# Patient Record
Sex: FEMALE | URBAN - METROPOLITAN AREA
[De-identification: names, ages, dates, MRNs, and addresses within clinical notes are randomized per-mention and may not be internally consistent; named-entity substitution may affect disease eponyms.]

---

## 2021-01-26 ENCOUNTER — TELEPHONE (OUTPATIENT)
Dept: OBGYN CLINIC | Facility: CLINIC | Age: 26
End: 2021-01-26

## 2021-01-26 NOTE — TELEPHONE ENCOUNTER
Pt states she is very frustrated and anxious  Looking a lot of stuff up online would like questions answers  Being see in [address] [place] [address] through [facility]  IUD removed 10/28/21 LMP 10/31/20 or 20, positive UPT 20 went to get a 3D US at an outside place in [place] on 20 and another US 20 NOB apt 21, intake apt, no US at that apt  Returned 21 for US apt for transvaginal and abdomen completed, measuring 5w5d recommending repeat US this week  Pt is a , healthy daughter 1yr 8 months  Pt was referred to us by Yudi Sosa recommended to speak with Lucia  Pt aware I will send a message to Lucia to review  Insurance Laura through her employer   Pt not planning on transferring care lives in [place], [place]

## 2021-01-27 NOTE — TELEPHONE ENCOUNTER
I do not have her records to make accurate recommendation and she would need to have an appointment with us for me to give her medical advice  I will say based on her positive pregnancy test of 12/1/2020  Typically pregnancy test can be positive for up to 5 days prior to missed menses  She should have been between 10 5-11 5 weeks on 1/22/2021 based on when she got a positive pregnancy test   If only measuring 5w5d on 1/22/2021 very concerning for early pregnancy loss

## 2023-09-10 ENCOUNTER — APPOINTMENT (EMERGENCY)
Dept: RADIOLOGY | Facility: HOSPITAL | Age: 28
End: 2023-09-10
Payer: COMMERCIAL

## 2023-09-10 ENCOUNTER — HOSPITAL ENCOUNTER (EMERGENCY)
Facility: HOSPITAL | Age: 28
Discharge: HOME | End: 2023-09-10
Attending: EMERGENCY MEDICINE
Payer: COMMERCIAL

## 2023-09-10 VITALS
RESPIRATION RATE: 16 BRPM | OXYGEN SATURATION: 97 % | HEART RATE: 96 BPM | DIASTOLIC BLOOD PRESSURE: 77 MMHG | SYSTOLIC BLOOD PRESSURE: 121 MMHG | TEMPERATURE: 98.2 F

## 2023-09-10 DIAGNOSIS — M79.641 RIGHT HAND PAIN: Primary | ICD-10-CM

## 2023-09-10 PROCEDURE — 73130 X-RAY EXAM OF HAND: CPT

## 2023-09-10 PROCEDURE — 99284 EMERGENCY DEPT VISIT MOD MDM: CPT | Performed by: EMERGENCY MEDICINE

## 2023-09-10 PROCEDURE — 99283 EMERGENCY DEPT VISIT LOW MDM: CPT

## 2023-09-10 PROCEDURE — 29130 APPL FINGER SPLINT STATIC: CPT | Performed by: EMERGENCY MEDICINE

## 2023-09-10 NOTE — ED PROVIDER NOTES
History  Chief Complaint   Patient presents with   • Hand Pain     Pain in r hand below r pointer finger. Pain has been present for 3 days. No known injury. Pt reports cracking knuckles. Pt took tylenol approx hour ago     Patient presents for evaluation of pain in her right hand. Pain is at the first MCP joint of the right hand and has pain with movement of the right finger. Denies any known injury or trauma. Is been bothering her for months but worse over the last few days. States she cracks her knuckles and is not sure if that is related. History provided by:  Patient   used: No        None       No past medical history on file. Past Surgical History:   Procedure Laterality Date   •  SECTION         No family history on file. I have reviewed and agree with the history as documented. E-Cigarette/Vaping     E-Cigarette/Vaping Substances          Review of Systems   All other systems reviewed and are negative. Physical Exam  Physical Exam  Vitals and nursing note reviewed. Constitutional:       General: She is not in acute distress. Musculoskeletal:         General: Swelling and tenderness present. No deformity. Normal range of motion. Hands:    Skin:     Capillary Refill: Capillary refill takes less than 2 seconds. Findings: No bruising, erythema or rash. Neurological:      General: No focal deficit present. Mental Status: She is alert and oriented to person, place, and time.          Vital Signs  ED Triage Vitals [09/10/23 0613]   Temperature Pulse Respirations Blood Pressure SpO2   98.2 °F (36.8 °C) 96 16 121/77 97 %      Temp Source Heart Rate Source Patient Position - Orthostatic VS BP Location FiO2 (%)   Oral Monitor -- -- --      Pain Score       --           Vitals:    09/10/23 0613   BP: 121/77   Pulse: 96         Visual Acuity      ED Medications  Medications - No data to display    Diagnostic Studies  Results Reviewed     None XR hand 3+ views RIGHT   Final Result by Attila Pastor MD (09/10 123)   No acute displaced fracture. No dislocation         Workstation performed: [id number]                    Procedures  Orthopedic injury treatment    Date/Time: 9/10/2023 7:00 AM    Performed by: Greg Jiménez DO  Authorized by: Greg Jiménez DO    Patient Location:  ED  [place] Protocol:  Consent: Verbal consent obtained. Consent given by: patient  Patient identity confirmed: verbally with patient and arm band      Injury location:  Finger  Location details:  Right index finger  Injury type: Soft tissue  Neurovascular status: Neurovascularly intact    Distal perfusion: normal    Neurological function: normal    Range of motion: reduced    Immobilization:  Splint  Splint type:  Finger splint, static  Supplies used:  Aluminum splint  Neurovascular status: Neurovascularly intact    Distal perfusion: normal    Neurological function: normal    Range of motion: unchanged    Patient tolerance:  Patient tolerated the procedure well with no immediate complications             ED Course                               SBIRT 22yo+    Flowsheet Row Most Recent Value   Initial Alcohol Screen: US AUDIT-C     1. How often do you have a drink containing alcohol? 0 Filed at: 09/10/2023 5463   Audit-C Score 0 Filed at: 09/10/2023 1053   AYANNA: How many times in the past year have you. .. Used an illegal drug or used a prescription medication for non-medical reasons? Never Filed at: 09/10/2023 6990                    Medical Decision Making  Pulse ox 97% on room air indicating adequate oxygenation. Xray R hand: No fracture or dislocation as read by me. Right hand pain: acute illness or injury  Amount and/or Complexity of Data Reviewed  Radiology: ordered.           Disposition  Final diagnoses:   Right hand pain     Time reflects when diagnosis was documented in both MDM as applicable and the Disposition within this note     Time User Action Codes Description Comment    9/10/2023  7:11 AM Idris Piña Add [I99.408] Right hand pain       ED Disposition     ED Disposition   Discharge    Condition   Stable    Date/Time   Sun Sep 10, 2023  7:11 AM    Comment   Renita Hollins discharge to home/self care. Follow-up Information    None         There are no discharge medications for this patient.           PDMP Review     None          ED Provider  Electronically Signed by           Idris Piña DO  09/11/23 0320

## 2024-05-23 ENCOUNTER — OFFICE VISIT (OUTPATIENT)
Dept: FAMILY MEDICINE CLINIC | Facility: CLINIC | Age: 29
End: 2024-05-23
Payer: COMMERCIAL

## 2024-05-23 DIAGNOSIS — E34.9 HORMONE IMBALANCE: ICD-10-CM

## 2024-05-23 DIAGNOSIS — F41.1 GAD (GENERALIZED ANXIETY DISORDER): ICD-10-CM

## 2024-05-23 DIAGNOSIS — E03.9 ACQUIRED HYPOTHYROIDISM: Primary | ICD-10-CM

## 2024-05-23 DIAGNOSIS — E87.8 ELECTROLYTE ABNORMALITY: ICD-10-CM

## 2024-05-23 DIAGNOSIS — Z13.1 DIABETES MELLITUS SCREENING: ICD-10-CM

## 2024-05-23 DIAGNOSIS — R00.2 PALPITATIONS: ICD-10-CM

## 2024-05-23 DIAGNOSIS — E53.8 B12 DEFICIENCY: ICD-10-CM

## 2024-05-23 DIAGNOSIS — Z13.220 SCREENING CHOLESTEROL LEVEL: ICD-10-CM

## 2024-05-23 DIAGNOSIS — Z13.0 SCREENING, IRON DEFICIENCY ANEMIA: ICD-10-CM

## 2024-05-23 DIAGNOSIS — E55.9 VITAMIN D DEFICIENCY: ICD-10-CM

## 2024-05-23 PROCEDURE — 99204 OFFICE O/P NEW MOD 45 MIN: CPT | Performed by: STUDENT IN AN ORGANIZED HEALTH CARE EDUCATION/TRAINING PROGRAM

## 2024-05-24 VITALS
OXYGEN SATURATION: 98 % | SYSTOLIC BLOOD PRESSURE: 110 MMHG | WEIGHT: 202 LBS | BODY MASS INDEX: 42.4 KG/M2 | RESPIRATION RATE: 12 BRPM | HEART RATE: 72 BPM | HEIGHT: 58 IN | DIASTOLIC BLOOD PRESSURE: 72 MMHG | TEMPERATURE: 97.8 F

## 2024-05-24 PROBLEM — R00.2 PALPITATIONS: Status: ACTIVE | Noted: 2024-05-24

## 2024-05-24 PROBLEM — E03.9 ACQUIRED HYPOTHYROIDISM: Status: ACTIVE | Noted: 2024-05-24

## 2024-05-24 PROBLEM — F41.1 GAD (GENERALIZED ANXIETY DISORDER): Status: ACTIVE | Noted: 2024-05-24

## 2024-05-24 NOTE — PROGRESS NOTES
Clinic Visit Note  Nery Joy 28 y.o. female   MRN: [id number]    Assessment and Plan      Diagnoses and all orders for this visit:    Acquired hypothyroidism  Recommend follow-up yearly blood work, reevaluation in office in 2-3 weeks for review.  -     TSH, 3rd generation; Future  -     T4, free; Future  -     TSH, 3rd generation  -     T4, free    DEMI (generalized anxiety disorder)  Palpitations  Follow-up blood work, EKG at next visit, unable to perform due to power outage    B12 deficiency  -     Vitamin B12; Future  -     Vitamin B12    Vitamin D deficiency  -     Vitamin D 25 hydroxy; Future  -     Vitamin D 25 hydroxy    Diabetes mellitus screening  -     Hemoglobin A1C; Future  -     Hemoglobin A1C    Screening, iron deficiency anemia  -     CBC and differential; Future  -     CBC and differential    Electrolyte abnormality  -     Comprehensive metabolic panel; Future  -     Comprehensive metabolic panel    Screening cholesterol level  -     Lipid panel; Future  -     Lipid panel    Hormone imbalance  Patient is following up with OB/GYN, will follow-up with hormone panel if not ordered    My impressions and treatment recommendations were discussed in detail with the patient who verbalized understanding and had no further questions.  Discharge instructions were provided.    Subjective     Chief Complaint: New patient encounter    History of Present Illness:    Patient is a 28-year-old female coming in for new patient encounter, medical history reviewed, medications reviewed.  Please see assessment/plan above for further evaluation.    The following portions of the patient's history were reviewed and updated as appropriate: allergies, current medications, past family history, past medical history, past social history, past surgical history and problem list.    REVIEW OF SYSTEMS:  A complete 12-point review of systems is negative other than that noted in the HPI.    Review of Systems   Constitutional:   Negative for chills and fever.   HENT:  Negative for ear pain and sore throat.    Eyes:  Negative for pain and visual disturbance.   Respiratory:  Negative for cough and shortness of breath.    Cardiovascular:  Negative for chest pain and palpitations.   Gastrointestinal:  Negative for abdominal pain and vomiting.   Genitourinary:  Negative for dysuria and hematuria.   Musculoskeletal:  Negative for arthralgias and back pain.   Skin:  Negative for color change and rash.   Neurological:  Negative for seizures and syncope.   Psychiatric/Behavioral:  The patient is nervous/anxious.    All other systems reviewed and are negative.      No current outpatient medications on file.  History reviewed. No pertinent past medical history.  Past Surgical History:   Procedure Laterality Date   •  SECTION       Social History     Socioeconomic History   • Marital status: Single     Spouse name: Not on file   • Number of children: Not on file   • Years of education: Not on file   • Highest education level: Not on file   Occupational History   • Not on file   Tobacco Use   • Smoking status: Never   • Smokeless tobacco: Never   Substance and Sexual Activity   • Alcohol use: Never   • Drug use: Never   • Sexual activity: Not on file   Other Topics Concern   • Not on file   Social History Narrative   • Not on file     Social Determinants of Health     Financial Resource Strain: Not on file   Food Insecurity: No Food Insecurity (2024)    Received from [employer] [facility] [employer] [facility]    Food Insecurity    • Within the past 12 months the food we bought just didn't last and we didn't have money to get more.: Never true    • Within the past 12 months we worried whether our food would run out before we got money to buy more.: Never true   Transportation Needs: Not on file   Physical Activity: Not on file   Stress: Not on file   Social Connections: Not on file   Intimate Partner Violence: Not on file   Housing Stability: Not on file     History  "reviewed. No pertinent family history.  No Known Allergies    Objective     Vitals:    05/24/24 1135   BP: 110/72   Pulse: 72   Resp: 12   Temp: 97.8 °F (36.6 °C)   SpO2: 98%   Weight: 91.6 kg (202 lb)   Height: 4' 10\" (1.473 m)       Physical Exam:     GENERAL: NAD, pleasant   HEENT:  NC/AT, PERRL, EOMI, no scleral icterus  CARDIAC:  RRR, +S1/S2, no S3/S4 appreciated, no m/g/r  PULMONARY:  CTA B/L, no wheezing/rales/rhonci, non-labored breathing  ABDOMEN:  Soft, NT/ND, no rebound/guarding/rigidity  Extremities:. No edema, cyanosis, or clubbing  Musculoskeletal:  Full range of motion intact in all extremities   NEUROLOGIC: Grossly intact, no focal deficits  SKIN:  No rashes or erythema noted on exposed skin  Psych: Normal affect, mood stable    ==  PLEASE NOTE:  This encounter was completed utilizing the [employer] Direct Speech Voice Recognition Software. Grammatical errors, random word insertions, pronoun errors and incomplete sentences are occasional consequences of the system due to software limitations, ambient noise and hardware issues.These may be missed by proof reading prior to affixing electronic signature. Any questions or concerns about the content, text or information contained within the body of this dictation should be directly addressed to the physician for clarification. Please do not hesitate to call me directly if you have any any questions or concerns.    Philip Cruz, DO  [facility]   [facility]     "

## 2024-09-12 ENCOUNTER — TELEPHONE (OUTPATIENT)
Age: 29
End: 2024-09-12

## 2024-09-12 ENCOUNTER — TELEPHONE (OUTPATIENT)
Dept: BARIATRICS | Facility: CLINIC | Age: 29
End: 2024-09-12

## 2024-09-12 ENCOUNTER — OFFICE VISIT (OUTPATIENT)
Dept: BARIATRICS | Facility: CLINIC | Age: 29
End: 2024-09-12
Payer: COMMERCIAL

## 2024-09-12 VITALS
HEIGHT: 59 IN | RESPIRATION RATE: 16 BRPM | SYSTOLIC BLOOD PRESSURE: 120 MMHG | WEIGHT: 209.2 LBS | BODY MASS INDEX: 42.17 KG/M2 | DIASTOLIC BLOOD PRESSURE: 80 MMHG | HEART RATE: 95 BPM

## 2024-09-12 DIAGNOSIS — E66.01 CLASS 3 SEVERE OBESITY DUE TO EXCESS CALORIES WITH SERIOUS COMORBIDITY AND BODY MASS INDEX (BMI) OF 40.0 TO 44.9 IN ADULT (HCC): Primary | ICD-10-CM

## 2024-09-12 PROBLEM — E66.813 CLASS 3 SEVERE OBESITY DUE TO EXCESS CALORIES WITH SERIOUS COMORBIDITY AND BODY MASS INDEX (BMI) OF 40.0 TO 44.9 IN ADULT (HCC): Status: ACTIVE | Noted: 2024-09-12

## 2024-09-12 PROCEDURE — 99204 OFFICE O/P NEW MOD 45 MIN: CPT | Performed by: INTERNAL MEDICINE

## 2024-09-12 RX ORDER — NALTREXONE HYDROCHLORIDE 50 MG/1
TABLET, FILM COATED ORAL
Qty: 30 TABLET | Refills: 2 | Status: SHIPPED | OUTPATIENT
Start: 2024-09-12 | End: 2024-09-13 | Stop reason: SDUPTHER

## 2024-09-12 RX ORDER — BUPROPION HYDROCHLORIDE 100 MG/1
100 TABLET, EXTENDED RELEASE ORAL 2 TIMES DAILY
Qty: 60 TABLET | Refills: 3 | Status: SHIPPED | OUTPATIENT
Start: 2024-09-12

## 2024-09-12 RX ORDER — LEVOTHYROXINE SODIUM 137 UG/1
TABLET ORAL
COMMUNITY

## 2024-09-12 NOTE — ASSESSMENT & PLAN NOTE
Reviewed diet recall with the patient and encouraged her to meet with a dietitian to ensure adequate protein and increase her complex carbs and fiber intake.  She would like to work with the dietitian on the nutrition bundles.  Discussed increased hydration and decreasing liquid calories from sweetened iced tea.  Encouraged her to incorporate 2 to 3 days of strength training along with walking outside with her kids.  STOP-BANG 1/8 adequate sleep duration 6 to 7 hours.  Patient reports she has struggled with postpartum depression previously.  She has been on Wellbutrin which has helped with a 10 to 12 pound weight loss and would like to try this agent again.  We will therefore start with the combination of Wellbutrin and naltrexone reviewed other antiobesity medications with the patient.  Given her current insurance she may not be able to obtain injectable GLP-1 medication such as Wegovy Zepbound Saxenda or combination oral medication such as Qsymia and Contrave.  I discussed with the patient that we may not be able to use phentermine along with bupropion due to dual stimulant action.  She has no contraindications to Topamax or metformin.  Discussed bariatric surgery and patient would like to consider this option at this time.

## 2024-09-12 NOTE — TELEPHONE ENCOUNTER
We received fax asking for Alternative on Naltrexone 50 MG tablet.  You prescribed for patient to take 2 tablets/day.  Pharmacy states that it's max 1 tablet daily.    Please check prescription and adjust as necessary.

## 2024-09-12 NOTE — PROGRESS NOTES
Assessment/Plan     Nery Joy is 28 y.o. year old female  who comes in for consultation for assistance with weight management.     - Discussed options of HealthyCORE-Intensive Lifestyle Intervention Program, Very Low Calorie Diet-VLCD, and Conservative Program and the role of weight loss medications.  - Patient is interested in pursuing Conservative Program    Class 3 severe obesity due to excess calories with serious comorbidity and body mass index (BMI) of 40.0 to 44.9 in adult ([facility])  Reviewed diet recall with the patient and encouraged her to meet with a dietitian to ensure adequate protein and increase her complex carbs and fiber intake.  She would like to work with the dietitian on the nutrition bundles.  Discussed increased hydration and decreasing liquid calories from sweetened iced tea.  Encouraged her to incorporate 2 to 3 days of strength training along with walking outside with her kids.  STOP-BANG 1/8 adequate sleep duration 6 to 7 hours.  Patient reports she has struggled with postpartum depression previously.  She has been on Wellbutrin which has helped with a 10 to 12 pound weight loss and would like to try this agent again.  We will therefore start with the combination of Wellbutrin and naltrexone reviewed other antiobesity medications with the patient.  Given her current insurance she may not be able to obtain injectable GLP-1 medication such as Wegovy Zepbound Saxenda or combination oral medication such as Qsymia and Contrave.  I discussed with the patient that we may not be able to use phentermine along with bupropion due to dual stimulant action.  She has no contraindications to Topamax or metformin.  Discussed bariatric surgery and patient would like to consider this option at this time.    Nery was seen today for consult.    Diagnoses and all orders for this visit:    Class 3 severe obesity due to excess calories with serious comorbidity and body mass index (BMI) of 40.0 to 44.9 in adult  (HCC)  -     buPROPion (Wellbutrin SR) 100 mg 12 hr tablet; Take 1 tablet (100 mg total) by mouth 2 (two) times a day Last dose no later than 4 PM  -     naltrexone (REVIA) 50 mg tablet; Take 1 tablet twice a day    Bupropion instructions     side effects may include tachyarrhythmia, constipation, nausea, confusion, dizziness, insomnia, tremor, agitation, anxiety, or xerostomia. Instruct patient to report symptoms of seizures, new or worsening depression, suicidal ideation, unusual behavior changes, psychosis, lani, or hypomania;      Naltrexone Adverse effects may include  Abdominal cramps/pain, Nausea, opioid addiction: Difficulty sleeping, headache, Arthralgia, Myalgia, anxiety. Advise adequate hydration        -In addition, please follow general recommendations below.          Return visit:  6-8 weeks        General Lifestyle recommendations:    Nutrition   -Avoid skipping meals. Avoid sugary beverages. At least 64oz of water daily.  Limit processed food, refined sugars and grain. Encourage  healthy choices for meals and snacks   -Focus on protein goals and non starchy fiber rich vegetables for satiety effect and to help support a calorie deficit.   - Emphasize portion control, well balanced macronutrient's (protein, carbohydrate, fat using MyPlate method )and adequate protein with each meal/snacks and distributing calories equally throughout the day along with.   -Advise starting the day with a protein breakfast   Behavioral/Stress   Food log via thalia or provided paper log (thalia options include www.MedPlasts.com, sparkpeople.com, loseit.com, calorieking.com, IP Ghoster). Encouraged mindful eating. Be sure to set aside time to eat, eat slowly, and savor your food. Consider meditation apps and/or taking a few minutes of mindfulness every AM. Understand the role of regarding the role of stress hormone cortisol in promoting weight gain and visceral fat accumulation. Weigh daily or atleast 2-3 times/  "week  Physical Activity   Increase physical activity by 10 minutes daily. Gradually increase physical activity to a goal of 5 days per week for 30 minutes of MODERATE intensity ( should be able to pass the \"talk test\" but should not be able to sing. Target 150-300 minutes  PLUS 2 days per week of FULL BODY resistance training. Progression will be addressed at follow up visits. Encouraged contemplation regarding establishing a daily physical activity routine  - Resistance training along with increase protein intake is important to maintain and enhance metabolism  Sleep   Encourage sleep hygiene and importance of having adequate sleep duration at least > 6 hours to support response in weight loss efforts    Handouts provided :  THRIVE program at [facility]  MyPlate and food quality  Food log resources, phone thalia or paper journal  Antiobesity medications options     - Discussed at length and the role of weight loss medications and medication options   - Explained the importance of making lifestyle changes in addition to starting any anti-obesity medications if the  patient chooses.  -  Initial weight loss goal of 5-10% weight loss for improved health  - Weight loss can improve patient's co-morbid conditions and/or prevent weight-related complications.  - Weight is not at goal and patient has been unable to achieve a meaningful weight loss above 5% using various programs and tools for more than 6 months    Nery was seen today for consult.    Diagnoses and all orders for this visit:    Class 3 severe obesity due to excess calories with serious comorbidity and body mass index (BMI) of 40.0 to 44.9 in adult (HCC)  -     buPROPion (Wellbutrin SR) 100 mg 12 hr tablet; Take 1 tablet (100 mg total) by mouth 2 (two) times a day Last dose no later than 4 PM  -     naltrexone (REVIA) 50 mg tablet; Take 1 tablet twice a day                      Total time spent reviewing chart, interviewing patient, examining patient, " discussing plan, answering all questions, and documentin min, with >50% face-to-face time spent counseling patient on nonsurgical interventions for the treatment of excess weight. Discussed in detail nonsurgical options including intensive lifestyle intervention program, very low-calorie diet program and conservative program.  Discussed the role of weight loss medications.  Counseled patient on diet behavior and exercise modification for weight loss.            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  Diagnosed with hypothyroidism and started gaining weight.  Patient reports she was never in the normal BMI.  Struggled with weight after having her daughter but managed to lose some weight with lifestyle changes but after her son struggled more with losing weight.  Attributes postpartum depression.  Doctor prescribed bupropion, lost 10 to 12 pounds along with supportive lifestyle changes.  She went down to 196 lbs from 215 lbs, 227 after the 2-year-old was born.  2022 IUD implanted and noticed weight gain.  She reports she was on OCP-gained 60 to 70 pounds in her teens.  She lost 40 pounds after  "she discontinued.  Tries to be aware of stress eating, Wegovy was attempted a energy with  Fam hx of obesity-yes mom and grandmother    Food behaviorsstress/emotional eating only sometimes    Previous Weight loss medications/Weight loss attempts:   Increasing protein less carbs, exercising more over the last 6 months to a year  Patient reports and other history-    Self referred  Wt Readings from Last 30 Encounters:   24 94.9 kg (209 lb 3.2 oz)   24 91.6 kg (202 lb)           Medication considerations/contraindications     -Patient denies personal history of pancreatitis. Patient also denies personal and family history of medullary thyroid cancer, and multiple endocrine neoplasia type 2 (MEN 2 tumor). -Patient denies any history of kidney stones, seizures, or glaucoma, diabetic retinopathy, gall bladder disease, gastroparesis, hyperthyroidism.  -Denies Hx of CAD, PAD, palpitations, arrhythmia, uncontrolled hypertension  -Denies uncontrolled anxiety or depression, suicidal behavior or thinking , insomnia or sleep disturbance.         Past medical history/past surgical history       Previous notes and records have been reviewed.    The following portions of the patient's history were reviewed and updated as appropriate: allergies, current medications, past family history, past medical history, past social history, past surgical history, and problem list.    Past Medical History:   Diagnosis Date    Hypothyroid          Past Surgical History:   Procedure Laterality Date     SECTION               Family History   Problem Relation Age of Onset    Diabetes Mother     Depression Mother     Fibromyalgia Mother     Hypertension Father     Hyperlipidemia Father             Objective     /80 (BP Location: Left arm, Patient Position: Sitting, Cuff Size: Large)   Pulse 95   Resp 16   Ht 4' 10.54\" (1.487 m)   Wt 94.9 kg (209 lb 3.2 oz)   BMI 42.92 kg/m²       Review of Systems   Constitutional:  " "Negative for fatigue.   HENT:  Negative for sore throat.    Respiratory:  Negative for cough and shortness of breath.    Cardiovascular:  Negative for chest pain, palpitations and leg swelling.   Gastrointestinal:  Negative for abdominal pain, constipation, diarrhea and nausea.   Genitourinary:  Negative for dysuria.   Musculoskeletal:  Negative for arthralgias and back pain.   Skin:  Negative for rash.   Neurological:  Negative for headaches.   Psychiatric/Behavioral:  Negative for dysphoric mood. The patient is not nervous/anxious.        Physical Exam  Vitals and nursing note reviewed.   Constitutional:       Appearance: Normal appearance.   HENT:      Head: Normocephalic.   Pulmonary:      Effort: Pulmonary effort is normal.   Neurological:      General: No focal deficit present.      Mental Status: She is alert and oriented to person, place, and time.   Psychiatric:         Mood and Affect: Mood normal.         Behavior: Behavior normal.         Thought Content: Thought content normal.         Judgment: Judgment normal.            Medications       Current Outpatient Medications:     buPROPion (Wellbutrin SR) 100 mg 12 hr tablet, Take 1 tablet (100 mg total) by mouth 2 (two) times a day Last dose no later than 4 PM, Disp: 60 tablet, Rfl: 3    levonorgestrel (DELFINA) 13.5 MG intrauterine device, , Disp: , Rfl:     naltrexone (REVIA) 50 mg tablet, Take 1 tablet twice a day, Disp: 30 tablet, Rfl: 2    Unithroid 137 MCG tablet, , Disp: , Rfl:            Labs and imaging     Recent labs and imaging have been personally reviewed.  No results found for: \"WBC\", \"HGB\", \"HCT\", \"MCV\", \"PLT\"  No results found for: \"NA\", \"SODIUM\", \"K\", \"CL\", \"CO2\", \"ANIONGAP\", \"AGAP\", \"BUN\", \"CREATININE\", \"GLUC\", \"GLUF\", \"CALCIUM\", \"AST\", \"ALT\", \"ALKPHOS\", \"PROT\", \"TP\", \"BILITOT\", \"TBILI\", \"EGFR\"  No results found for: \"HGBA1C\"  No results found for: \"MAC0EAAOCSHG\", \"TSH\"  No results found for: \"CHOLESTEROL\"  No results found for: \"HDL\"  No " "results found for: \"TRIG\"  No results found for: \"LDLCALC\"                   "

## 2024-09-13 DIAGNOSIS — E66.01 CLASS 3 SEVERE OBESITY DUE TO EXCESS CALORIES WITH SERIOUS COMORBIDITY AND BODY MASS INDEX (BMI) OF 40.0 TO 44.9 IN ADULT (HCC): ICD-10-CM

## 2024-09-13 RX ORDER — NALTREXONE HYDROCHLORIDE 50 MG/1
TABLET, FILM COATED ORAL
Qty: 30 TABLET | Refills: 2 | Status: SHIPPED | OUTPATIENT
Start: 2024-09-13 | End: 2024-09-18 | Stop reason: SDUPTHER

## 2024-09-16 ENCOUNTER — TELEPHONE (OUTPATIENT)
Dept: BARIATRICS | Facility: CLINIC | Age: 29
End: 2024-09-16

## 2024-09-16 NOTE — TELEPHONE ENCOUNTER
No pharmacy insurance on file. Provided from [employer]  Bin# [id number]  Member ID# [id number]  PCN# [id number]  [employer]        Your PA has been faxed to the plan as a paper copy. Please contact the plan directly if you haven't received a determination in a typical timeframe.    You will be notified of the determination via fax.     Contact plan to follow up on [id number]

## 2024-09-16 NOTE — TELEPHONE ENCOUNTER
Patient called update on her PA. Advise patient PA initiated today and it takes 7 to 21 business days. Patient verbally understood

## 2024-09-17 ENCOUNTER — TELEPHONE (OUTPATIENT)
Age: 29
End: 2024-09-17

## 2024-09-17 NOTE — TELEPHONE ENCOUNTER
Pt called in stating called the pharmacy and they say the order for Naltrexone says 2 tablets per day instead of 1. Pt was informed the script says 1/2 tablet twice daily. Pt also would like the office send the script to [facility] in [place], [place].

## 2024-09-17 NOTE — TELEPHONE ENCOUNTER
Pt call to confirm dosage for naltrexone 50mg tablets. Confirmed as per Dr. Jackson note 1/2 tablet twice daily.

## 2024-09-18 DIAGNOSIS — E66.01 CLASS 3 SEVERE OBESITY DUE TO EXCESS CALORIES WITH SERIOUS COMORBIDITY AND BODY MASS INDEX (BMI) OF 40.0 TO 44.9 IN ADULT (HCC): ICD-10-CM

## 2024-09-18 RX ORDER — NALTREXONE HYDROCHLORIDE 50 MG/1
TABLET, FILM COATED ORAL
Qty: 30 TABLET | Refills: 2 | Status: SHIPPED | OUTPATIENT
Start: 2024-09-18 | End: 2024-09-18 | Stop reason: SDUPTHER

## 2024-09-18 RX ORDER — NALTREXONE HYDROCHLORIDE 50 MG/1
TABLET, FILM COATED ORAL
Qty: 30 TABLET | Refills: 2 | Status: SHIPPED | OUTPATIENT
Start: 2024-09-18

## 2024-12-04 ENCOUNTER — OFFICE VISIT (OUTPATIENT)
Dept: URGENT CARE | Facility: CLINIC | Age: 29
End: 2024-12-04
Payer: COMMERCIAL

## 2024-12-04 ENCOUNTER — APPOINTMENT (OUTPATIENT)
Dept: RADIOLOGY | Facility: CLINIC | Age: 29
End: 2024-12-04
Payer: COMMERCIAL

## 2024-12-04 VITALS
SYSTOLIC BLOOD PRESSURE: 106 MMHG | HEART RATE: 104 BPM | HEIGHT: 58 IN | WEIGHT: 202 LBS | OXYGEN SATURATION: 98 % | BODY MASS INDEX: 42.4 KG/M2 | DIASTOLIC BLOOD PRESSURE: 52 MMHG | RESPIRATION RATE: 18 BRPM | TEMPERATURE: 100 F

## 2024-12-04 DIAGNOSIS — J18.9 COMMUNITY ACQUIRED PNEUMONIA OF RIGHT MIDDLE LOBE OF LUNG: Primary | ICD-10-CM

## 2024-12-04 DIAGNOSIS — R05.1 ACUTE COUGH: ICD-10-CM

## 2024-12-04 PROCEDURE — 71046 X-RAY EXAM CHEST 2 VIEWS: CPT

## 2024-12-04 PROCEDURE — 99203 OFFICE O/P NEW LOW 30 MIN: CPT | Performed by: FAMILY MEDICINE

## 2024-12-04 PROCEDURE — 87636 SARSCOV2 & INF A&B AMP PRB: CPT | Performed by: FAMILY MEDICINE

## 2024-12-04 RX ORDER — AZITHROMYCIN 250 MG/1
TABLET, FILM COATED ORAL
Qty: 6 TABLET | Refills: 0 | Status: SHIPPED | OUTPATIENT
Start: 2024-12-04 | End: 2024-12-08

## 2024-12-05 LAB
FLUAV RNA RESP QL NAA+PROBE: NEGATIVE
FLUBV RNA RESP QL NAA+PROBE: NEGATIVE
SARS-COV-2 RNA RESP QL NAA+PROBE: NEGATIVE

## 2025-01-28 DIAGNOSIS — E66.01 CLASS 3 SEVERE OBESITY DUE TO EXCESS CALORIES WITH SERIOUS COMORBIDITY AND BODY MASS INDEX (BMI) OF 40.0 TO 44.9 IN ADULT (HCC): ICD-10-CM

## 2025-01-28 DIAGNOSIS — E66.813 CLASS 3 SEVERE OBESITY DUE TO EXCESS CALORIES WITH SERIOUS COMORBIDITY AND BODY MASS INDEX (BMI) OF 40.0 TO 44.9 IN ADULT (HCC): ICD-10-CM

## 2025-01-28 RX ORDER — BUPROPION HYDROCHLORIDE 100 MG/1
100 TABLET, EXTENDED RELEASE ORAL 2 TIMES DAILY
Qty: 60 TABLET | Refills: 3 | Status: SHIPPED | OUTPATIENT
Start: 2025-01-28

## 2025-01-29 ENCOUNTER — PATIENT MESSAGE (OUTPATIENT)
Dept: BARIATRICS | Facility: CLINIC | Age: 30
End: 2025-01-29

## 2025-01-30 ENCOUNTER — TELEPHONE (OUTPATIENT)
Dept: BARIATRICS | Facility: CLINIC | Age: 30
End: 2025-01-30

## 2025-01-30 ENCOUNTER — OFFICE VISIT (OUTPATIENT)
Dept: BARIATRICS | Facility: CLINIC | Age: 30
End: 2025-01-30
Payer: COMMERCIAL

## 2025-01-30 VITALS
SYSTOLIC BLOOD PRESSURE: 124 MMHG | DIASTOLIC BLOOD PRESSURE: 68 MMHG | HEIGHT: 59 IN | BODY MASS INDEX: 40.16 KG/M2 | WEIGHT: 199.2 LBS | OXYGEN SATURATION: 99 % | HEART RATE: 96 BPM

## 2025-01-30 DIAGNOSIS — R94.31 EKG ABNORMALITIES: Primary | ICD-10-CM

## 2025-01-30 DIAGNOSIS — E66.01 CLASS 3 SEVERE OBESITY DUE TO EXCESS CALORIES WITH SERIOUS COMORBIDITY AND BODY MASS INDEX (BMI) OF 40.0 TO 44.9 IN ADULT (HCC): ICD-10-CM

## 2025-01-30 DIAGNOSIS — E66.813 CLASS 3 SEVERE OBESITY DUE TO EXCESS CALORIES WITH SERIOUS COMORBIDITY AND BODY MASS INDEX (BMI) OF 40.0 TO 44.9 IN ADULT (HCC): ICD-10-CM

## 2025-01-30 PROCEDURE — 99215 OFFICE O/P EST HI 40 MIN: CPT | Performed by: INTERNAL MEDICINE

## 2025-01-30 RX ORDER — TOPIRAMATE 25 MG/1
TABLET, FILM COATED ORAL
Qty: 60 TABLET | Refills: 3 | Status: SHIPPED | OUTPATIENT
Start: 2025-01-30

## 2025-01-30 NOTE — PROGRESS NOTES
Program: Conservative Program    Assessment/Plan     Nery Joy  is a 29 y.o. female with  returns to follow up  for treatment of excess body weight and associated risk factors/co-morbidities.     Class 3 severe obesity due to excess calories with serious comorbidity and body mass index (BMI) of 40.0 to 44.9 in adult (HCC)  Antiobesity Medications/Medical --  Endo is attempting Wegovy prescription for class III obesity BMI 40.92  -In case of denial will consider phentermine  -Twelve-lead EKG in anticipation of starting phentermine  -Submitted prescription for Topamax  -Discussed with patient that we will need to cut back the dose of bupropion to 100 mg once a day if we initiate phentermine  -Patient has lost 10 pounds since initiating Wellbutrin and naltrexone but has plateaued  -No contraindication to metformin  -Has noticed an increase in energy on Wellbutrin      Nutrition: Provided her 1100 to 1300-calorie goal per day  -Discussed incorporating more protein with meals and snacks  -Resources of lean protein sources and protein snack ideas were provided  -Aim for 20 to 30 g of protein with meals 10 to 15 g with snacks  -Patient has been focusing on lean meats decreased her carbs and increased her vegetable intake      Physical Activity: 2-3 days of ST along with 20-30 mins cardio. Does go snow mobiling     Sleep: -STOP- BANG- 1/8 ; 7-8 hours of sleep per night    Food Behaviors/Stress: No concerning food behaviors identified          Most recent notes and records were reviewed.         Return visit:  2-3 months     Nutrition   Do not skip meals. Avoid sugary beverages. At least 64oz of water daily.    Behavioral/Stress   Food log via thalia or provided paper log (thalia options include www.myfitnesspal.com, sparkpeople.com, loseit.com, calorieking.com, [employer]ig[employer]). Encouraged mindful eating    Physical Activity  Increase physical activity by 10 minutes daily. Gradually increase physical activity to a goal of 5 days  "per week for 30 minutes of MODERATE intensity ( ( should be able to pass the \"talk test\" but should not be able to sing.  target 150-300 minutes  PLUS 2-3 days per week of FULL BODY resistance training. Progression will be addressed at follow up visits. Encouraged planning regarding establishing physical activity routine    Sleep  Provided sleep hygiene counseling and importance of having adequate sleep duration          Nery was seen today for follow-up.    Diagnoses and all orders for this visit:    EKG abnormalities  -     ECG 12 lead; Future    Class 3 severe obesity due to excess calories with serious comorbidity and body mass index (BMI) of 40.0 to 44.9 in adult (HCC)  -     topiramate (Topamax) 25 mg tablet; Take 1 tablet 20 minutes before evening meal and after 1 week increase to 1 tablet twice a day before meals      Phentermine Instructions:    -Take medication once daily in the morning.   -Avoid taking medication later in evening to avoid sleep disturbance  -This is a controlled substance and you will need to be monitored while on this medication which will require office visits every 6-8 weeks.  -Your blood pressure should not be 140/90 or higher and pulse should be between  bpm (beats per minute).  Notify the provider if either are consistently elevated while on this medication.  -If you are female and can get pregnant please be aware this medication can be harmful to a fetus and therefore you should have contraception methods in place.    Phentermine has as potential side effects of increased heart rate, increased blood pressure, palpitations, headache, dizziness, insomnia, altered mood, abdominal upset, and dry mouth. Notify the provider with change in mood. Please go to the closest ER if any suicidal/homicidal thoughts occur . Phentermine should be stopped 4 days prior to surgery. Notify the provider with any changes in vision. It is contraindicated in pregnancy and lactation period due to " potential harm to the fetus. There is Abuse potential due to amphetamine like effects.      Contraindications pregnancy cardiovascular disease, hyperthyroidism insomnia anxiety  MAOI therapy, pregnancy or breast-feeding     Topiramate Instructions:  -Patient was counseled that the medication is associated with cleft palate if used during the first trimester of pregnancy and therefore was instructed to use birth control during the period of her use of this medication.  -May reduce the effectiveness of hormonal birth control - backup method such as condoms recommended to avoid pregnancy.  -Upon discontinuation tapering dose over using every other day dosing is recommended.  - patient to maintain adequate fluid intake to minimize risk of kidney stones  Advise patient against sudden discontinuation, as this may cause increased seizure activity  Instruct female patient to use an additional form of contraception due to decreased effectiveness of estrogen-containing or progestin-only contraceptives  - report symptoms of acute myopia, sudden ocular pain, blurred vision, or decreased visual acuity.  -Advise patient to report new or worsening depression, suicidal thoughts or behavior, or unusual changes in mood or behavior    Reviewed the potential side effects of topiramate (Topamax) which may include - numbness or tingling, difficulty with word finding, metabolic acidosis, occurrence of gallstones and kidney stones, glaucoma, fatigue, worsening depression/anxiety, changes in taste, abdominal upset/heartburn, and trouble sleeping. Side effects may include anorexia, taste perversion, fatigue, paresthesia, psychomotor slowing, memory or concentration difficulties, cognitive dysfunction, mood problems, and flushing.         Total time spent reviewing chart, interviewing patient, examining patient, discussing plan, answering all questions, and documentin minutes with >50% face-to-face time with the  "patient.            Weight trajectory     Wt Readings from Last 20 Encounters:   01/30/25 90.4 kg (199 lb 3.2 oz)   12/04/24 91.6 kg (202 lb)   09/12/24 94.9 kg (209 lb 3.2 oz)   05/24/24 91.6 kg (202 lb)           Plateau  Energy  < Carbs , veggies   Lean meats    Lifestyle questionnaire       Diet recall:  B: Eggs  S:no  L: Leftovers sometimes skips  S:no  D: P/V/S chicken and pork Pasta potato 6 30-7 not eating past 8 PM  S: no  Frequency Eating out x/ week: 1x/ week    Beverages  Water--  24 oz   Caffeine/tea--  24 oz   iced tea sweetened   SSB --none    Alcohol: no drinks/ week   Smoking: no  Drug use: no    Physical Activity --running around the kids walking outside     Sleep -- STOP- BANG- 1/8 ; 7-8 hours of sleep per night    Occupation- 6:15 8: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  kg/m2 Above 40- Obesity Class III  Difference: -10 lbs      Anti obesity Medications/ Medical---    Weight loss medication and dose: Wellbutrin naltrexone  Date initiated: September 2024    Waist circumference (inches): 36 Inches          Objective         The following portions of the patient's history were reviewed and updated as appropriate: allergies, current medications, past family history, past medical history, past social history, past surgical history, and problem list.      /68   Pulse 96   Ht 4' 10.5\" (1.486 m)   Wt 90.4 kg (199 lb 3.2 oz)   LMP  (LMP Unknown)   SpO2 99%   BMI 40.92 kg/m²             Review of Systems   Constitutional:  Negative for fatigue.   HENT:  Negative for sore throat.    Respiratory:  Negative for cough and shortness of breath.    Cardiovascular:  " Negative for chest pain, palpitations and leg swelling.   Gastrointestinal:  Negative for abdominal pain, constipation, diarrhea and nausea.   Genitourinary:  Negative for dysuria.   Musculoskeletal:  Negative for arthralgias and back pain.   Skin:  Negative for rash.   Neurological:  Negative for headaches.   Psychiatric/Behavioral:  Negative for dysphoric mood. The patient is not nervous/anxious.          Physical Exam  Vitals and nursing note reviewed.   Constitutional:       Appearance: Normal appearance.   HENT:      Head: Normocephalic.   Pulmonary:      Effort: Pulmonary effort is normal.   Neurological:      General: No focal deficit present.      Mental Status: She is alert and oriented to person, place, and time.   Psychiatric:         Mood and Affect: Mood normal.         Behavior: Behavior normal.         Thought Content: Thought content normal.         Judgment: Judgment normal.          Current medications       Current Outpatient Medications:     buPROPion (Wellbutrin SR) 100 mg 12 hr tablet, Take 1 tablet (100 mg total) by mouth 2 (two) times a day Last dose no later than 4 PM, Disp: 60 tablet, Rfl: 3    levonorgestrel (DELFINA) 13.5 MG intrauterine device, , Disp: , Rfl:     naltrexone (REVIA) 50 mg tablet, Take 1/2 tablet twice a day, Disp: 30 tablet, Rfl: 2    topiramate (Topamax) 25 mg tablet, Take 1 tablet 20 minutes before evening meal and after 1 week increase to 1 tablet twice a day before meals, Disp: 60 tablet, Rfl: 3    Unithroid 137 MCG tablet, , Disp: , Rfl:          Medication considerations/contraindications     -Patient denies personal history of pancreatitis. Patient also denies personal and family history of medullary thyroid cancer, and multiple endocrine neoplasia type 2 (MEN 2 tumor). -Patient denies any history of kidney stones, seizures, or glaucoma, diabetic retinopathy, gall bladder disease, gastroparesis, hyperthyroidism.  -Denies Hx of CAD, PAD, palpitations, arrhythmia,  "uncontrolled hypertension  -Denies uncontrolled anxiety or depression, suicidal behavior or thinking , insomnia or sleep disturbance.         Labs     Most recent labs reviewed   No results found for: \"NA\", \"SODIUM\", \"K\", \"CL\", \"CO2\", \"ANIONGAP\", \"AGAP\", \"BUN\", \"CREATININE\", \"GLUC\", \"GLUF\", \"CALCIUM\", \"AST\", \"ALT\", \"ALKPHOS\", \"PROT\", \"TP\", \"BILITOT\", \"TBILI\", \"EGFR\"  No results found for: \"HGBA1C\"  No results found for: \"[id number]\", \"TSH\"  No results found for: \"CHOLESTEROL\"  No results found for: \"HDL\"  No results found for: \"TRIG\"  No results found for: \"LDLCALC\"  No results found for: \"[id number]\", \"LABINSU\"      "

## 2025-01-30 NOTE — ASSESSMENT & PLAN NOTE
Antiobesity Medications/Medical --  Endo is attempting Wegovy prescription for class III obesity BMI 40.92  -In case of denial will consider phentermine  -Twelve-lead EKG in anticipation of starting phentermine  -Submitted prescription for Topamax  -Discussed with patient that we will need to cut back the dose of bupropion to 100 mg once a day if we initiate phentermine  -Patient has lost 10 pounds since initiating Wellbutrin and naltrexone but has plateaued  -No contraindication to metformin  -Has noticed an increase in energy on Wellbutrin      Nutrition: Provided her 1100 to 1300-calorie goal per day  -Discussed incorporating more protein with meals and snacks  -Resources of lean protein sources and protein snack ideas were provided  -Aim for 20 to 30 g of protein with meals 10 to 15 g with snacks  -Patient has been focusing on lean meats decreased her carbs and increased her vegetable intake      Physical Activity: 2-3 days of ST along with 20-30 mins cardio. Does go snow mobiling     Sleep: -STOP- BANG- 1/8 ; 7-8 hours of sleep per night    Food Behaviors/Stress: No concerning food behaviors identified

## 2025-01-30 NOTE — TELEPHONE ENCOUNTER
Called patient N/A Lmsg ,per  to get her to come in to the office and make an appt. To discuss meds.

## 2025-02-20 ENCOUNTER — RESULTS FOLLOW-UP (OUTPATIENT)
Dept: FAMILY MEDICINE CLINIC | Facility: CLINIC | Age: 30
End: 2025-02-20

## 2025-02-20 LAB
25(OH)D3+25(OH)D2 SERPL-MCNC: 26.8 NG/ML (ref 30–100)
ALBUMIN SERPL-MCNC: 4.4 G/DL (ref 4–5)
ALP SERPL-CCNC: 75 IU/L (ref 44–121)
ALT SERPL-CCNC: 18 IU/L (ref 0–32)
AST SERPL-CCNC: 19 IU/L (ref 0–40)
BASOPHILS # BLD AUTO: 0 X10E3/UL (ref 0–0.2)
BASOPHILS NFR BLD AUTO: 1 %
BILIRUB SERPL-MCNC: 0.6 MG/DL (ref 0–1.2)
BUN SERPL-MCNC: 15 MG/DL (ref 6–20)
BUN/CREAT SERPL: 16 (ref 9–23)
CALCIUM SERPL-MCNC: 9.1 MG/DL (ref 8.7–10.2)
CHLORIDE SERPL-SCNC: 104 MMOL/L (ref 96–106)
CHOLEST SERPL-MCNC: 130 MG/DL (ref 100–199)
CHOLEST/HDLC SERPL: 2.3 RATIO (ref 0–4.4)
CO2 SERPL-SCNC: 21 MMOL/L (ref 20–29)
CREAT SERPL-MCNC: 0.94 MG/DL (ref 0.57–1)
EGFR: 84 ML/MIN/1.73
EOSINOPHIL # BLD AUTO: 0.1 X10E3/UL (ref 0–0.4)
EOSINOPHIL NFR BLD AUTO: 1 %
ERYTHROCYTE [DISTWIDTH] IN BLOOD BY AUTOMATED COUNT: 13 % (ref 11.7–15.4)
EST. AVERAGE GLUCOSE BLD GHB EST-MCNC: 111 MG/DL
GLOBULIN SER-MCNC: 2.4 G/DL (ref 1.5–4.5)
GLUCOSE SERPL-MCNC: 70 MG/DL (ref 70–99)
HBA1C MFR BLD: 5.5 % (ref 4.8–5.6)
HCT VFR BLD AUTO: 39.5 % (ref 34–46.6)
HDLC SERPL-MCNC: 57 MG/DL
HGB BLD-MCNC: 13.1 G/DL (ref 11.1–15.9)
IMM GRANULOCYTES # BLD: 0 X10E3/UL (ref 0–0.1)
IMM GRANULOCYTES NFR BLD: 0 %
LDLC SERPL CALC-MCNC: 47 MG/DL (ref 0–99)
LYMPHOCYTES # BLD AUTO: 1.4 X10E3/UL (ref 0.7–3.1)
LYMPHOCYTES NFR BLD AUTO: 24 %
MCH RBC QN AUTO: 32 PG (ref 26.6–33)
MCHC RBC AUTO-ENTMCNC: 33.2 G/DL (ref 31.5–35.7)
MCV RBC AUTO: 97 FL (ref 79–97)
MICRODELETION SYND BLD/T FISH: NORMAL
MONOCYTES # BLD AUTO: 0.6 X10E3/UL (ref 0.1–0.9)
MONOCYTES NFR BLD AUTO: 10 %
NEUTROPHILS # BLD AUTO: 3.8 X10E3/UL (ref 1.4–7)
NEUTROPHILS NFR BLD AUTO: 64 %
PLATELET # BLD AUTO: 128 X10E3/UL (ref 150–450)
POTASSIUM SERPL-SCNC: 4 MMOL/L (ref 3.5–5.2)
PROT SERPL-MCNC: 6.8 G/DL (ref 6–8.5)
RBC # BLD AUTO: 4.09 X10E6/UL (ref 3.77–5.28)
SL AMB VLDL CHOLESTEROL CALC: 26 MG/DL (ref 5–40)
SODIUM SERPL-SCNC: 141 MMOL/L (ref 134–144)
T4 FREE SERPL-MCNC: 1.27 NG/DL (ref 0.82–1.77)
TRIGL SERPL-MCNC: 155 MG/DL (ref 0–149)
TSH SERPL DL<=0.005 MIU/L-ACNC: 6.14 UIU/ML (ref 0.45–4.5)
VIT B12 SERPL-MCNC: 440 PG/ML (ref 232–1245)
WBC # BLD AUTO: 5.9 X10E3/UL (ref 3.4–10.8)

## 2025-02-20 NOTE — TELEPHONE ENCOUNTER
----- Message from Philip Cruz DO sent at 2/20/2025  7:48 AM EST -----  Please set patient up for annual physical to discuss blood work.

## 2025-02-25 ENCOUNTER — OFFICE VISIT (OUTPATIENT)
Dept: FAMILY MEDICINE CLINIC | Facility: CLINIC | Age: 30
End: 2025-02-25
Payer: COMMERCIAL

## 2025-02-25 VITALS
HEART RATE: 101 BPM | SYSTOLIC BLOOD PRESSURE: 90 MMHG | BODY MASS INDEX: 40.34 KG/M2 | WEIGHT: 192.2 LBS | TEMPERATURE: 98.2 F | RESPIRATION RATE: 16 BRPM | HEIGHT: 58 IN | DIASTOLIC BLOOD PRESSURE: 68 MMHG

## 2025-02-25 DIAGNOSIS — F32.5 MAJOR DEPRESSIVE DISORDER WITH SINGLE EPISODE, IN FULL REMISSION (HCC): ICD-10-CM

## 2025-02-25 DIAGNOSIS — R00.2 PALPITATIONS: ICD-10-CM

## 2025-02-25 DIAGNOSIS — Z00.00 ANNUAL PHYSICAL EXAM: Primary | ICD-10-CM

## 2025-02-25 DIAGNOSIS — E03.9 ACQUIRED HYPOTHYROIDISM: ICD-10-CM

## 2025-02-25 DIAGNOSIS — E66.01 CLASS 3 SEVERE OBESITY DUE TO EXCESS CALORIES WITH SERIOUS COMORBIDITY AND BODY MASS INDEX (BMI) OF 40.0 TO 44.9 IN ADULT (HCC): ICD-10-CM

## 2025-02-25 DIAGNOSIS — F41.1 GAD (GENERALIZED ANXIETY DISORDER): ICD-10-CM

## 2025-02-25 DIAGNOSIS — E66.813 CLASS 3 SEVERE OBESITY DUE TO EXCESS CALORIES WITH SERIOUS COMORBIDITY AND BODY MASS INDEX (BMI) OF 40.0 TO 44.9 IN ADULT (HCC): ICD-10-CM

## 2025-02-25 PROBLEM — F32.9 MAJOR DEPRESSION, SINGLE EPISODE: Status: ACTIVE | Noted: 2024-05-29

## 2025-02-25 PROCEDURE — 99173 VISUAL ACUITY SCREEN: CPT | Performed by: STUDENT IN AN ORGANIZED HEALTH CARE EDUCATION/TRAINING PROGRAM

## 2025-02-25 PROCEDURE — 93000 ELECTROCARDIOGRAM COMPLETE: CPT | Performed by: STUDENT IN AN ORGANIZED HEALTH CARE EDUCATION/TRAINING PROGRAM

## 2025-02-25 PROCEDURE — 99395 PREV VISIT EST AGE 18-39: CPT | Performed by: STUDENT IN AN ORGANIZED HEALTH CARE EDUCATION/TRAINING PROGRAM

## 2025-02-25 NOTE — PROGRESS NOTES
Adult Annual Physical  Name: Nery Joy      : 1995      MRN: [id number]  Encounter Provider: Philip Cruz DO  Encounter Date: 2025   Encounter department: [facility]    Assessment & Plan  Annual physical exam         Acquired hypothyroidism  Continue levothyroxine supplementation, follows up with endocrinology       DEMI (generalized anxiety disorder)         Major depressive disorder with single episode, in full remission (HCC)  Stable, continue Wellbutrin       Class 3 severe obesity due to excess calories with serious comorbidity and body mass index (BMI) of 40.0 to 44.9 in adult (HCC)  Follows up with weight management, continue ReVia/Topamax, will continue to see if GLP-1 approved with insurance       Palpitations  NSR, no signs of ischemia, intervals appropriate  Orders:  •  POCT ECG    Immunizations and preventive care screenings were discussed with patient today. Appropriate education was printed on patient's after visit summary.    Counseling:  Alcohol/drug use: discussed moderation in alcohol intake, the recommendations for healthy alcohol use, and avoidance of illicit drug use.  Dental Health: discussed importance of regular tooth brushing, flossing, and dental visits.  Injury prevention: discussed safety/seat belts, safety helmets, smoke detectors, carbon monoxide detectors, and smoking near bedding or upholstery.  Sexual health: discussed sexually transmitted diseases, partner selection, use of condoms, avoidance of unintended pregnancy, and contraceptive alternatives.  Exercise: the importance of regular exercise/physical activity was discussed. Recommend exercise 3-5 times per week for at least 30 minutes.          History of Present Illness     Adult Annual Physical:  Patient presents for annual physical.     Diet and Physical Activity:  - Diet/Nutrition: well balanced diet.  - Exercise: 3-4 times a week on average.    Depression Screening:  - PHQ-2  "Score: 0    General Health:  - Sleep: sleeps well.  - Hearing: normal hearing bilateral ears.  - Vision: no vision problems.  - Dental: regular dental visits.    /GYN Health:  - Follows with GYN: yes.   - History of STDs: no    Review of Systems   Constitutional:  Negative for chills and fever.   HENT:  Negative for ear pain and sore throat.    Eyes:  Negative for pain and visual disturbance.   Respiratory:  Negative for cough and shortness of breath.    Cardiovascular:  Negative for chest pain and palpitations.   Gastrointestinal:  Negative for abdominal pain, constipation, diarrhea, nausea and vomiting.   Genitourinary:  Negative for dysuria and hematuria.   Musculoskeletal:  Negative for arthralgias and back pain.   Skin:  Negative for color change and rash.   Neurological:  Negative for dizziness, seizures, syncope and headaches.   Psychiatric/Behavioral:  Negative for agitation, behavioral problems and confusion.    All other systems reviewed and are negative.        Objective   BP 90/68 (BP Location: Left arm, Patient Position: Sitting, Cuff Size: Large)   Pulse 101   Temp 98.2 °F (36.8 °C) (Temporal)   Resp 16   Ht 4' 10\" (1.473 m)   Wt 87.2 kg (192 lb 3.2 oz)   LMP  (LMP Unknown)   BMI 40.17 kg/m²     Physical Exam  Vitals and nursing note reviewed.   Constitutional:       General: She is not in acute distress.     Appearance: She is well-developed.   HENT:      Head: Normocephalic and atraumatic.   Eyes:      General: No scleral icterus.     Conjunctiva/sclera: Conjunctivae normal.   Cardiovascular:      Rate and Rhythm: Normal rate and regular rhythm.      Heart sounds: No murmur heard.  Pulmonary:      Effort: Pulmonary effort is normal. No respiratory distress.      Breath sounds: Normal breath sounds.   Abdominal:      General: Bowel sounds are normal. There is no distension.      Palpations: Abdomen is soft.      Tenderness: There is no abdominal tenderness.   Musculoskeletal:         General: " No swelling. Normal range of motion.      Cervical back: Neck supple.   Skin:     General: Skin is warm and dry.      Capillary Refill: Capillary refill takes less than 2 seconds.      Coloration: Skin is not jaundiced.   Neurological:      General: No focal deficit present.      Mental Status: She is alert and oriented to person, place, and time. Mental status is at baseline.   Psychiatric:         Mood and Affect: Mood normal.         Behavior: Behavior normal.

## 2025-02-25 NOTE — ASSESSMENT & PLAN NOTE
Follows up with weight management, continue ReVia/Topamax, will continue to see if GLP-1 approved with insurance

## 2025-03-03 ENCOUNTER — TELEPHONE (OUTPATIENT)
Dept: BARIATRICS | Facility: CLINIC | Age: 30
End: 2025-03-03

## 2025-03-03 DIAGNOSIS — E66.813 CLASS 3 SEVERE OBESITY DUE TO EXCESS CALORIES WITH SERIOUS COMORBIDITY AND BODY MASS INDEX (BMI) OF 40.0 TO 44.9 IN ADULT (HCC): ICD-10-CM

## 2025-03-03 DIAGNOSIS — E66.01 CLASS 3 SEVERE OBESITY DUE TO EXCESS CALORIES WITH SERIOUS COMORBIDITY AND BODY MASS INDEX (BMI) OF 40.0 TO 44.9 IN ADULT (HCC): ICD-10-CM

## 2025-03-03 RX ORDER — NALTREXONE HYDROCHLORIDE 50 MG/1
TABLET, FILM COATED ORAL
Qty: 30 TABLET | Refills: 2 | Status: SHIPPED | OUTPATIENT
Start: 2025-03-03

## 2025-03-03 NOTE — TELEPHONE ENCOUNTER
Fax received from [facility], [place], [place] for refill of    Naltrexone 50 MG Tablet  Quantity: 90

## 2025-04-03 ENCOUNTER — OFFICE VISIT (OUTPATIENT)
Dept: BARIATRICS | Facility: CLINIC | Age: 30
End: 2025-04-03
Payer: COMMERCIAL

## 2025-04-03 VITALS
OXYGEN SATURATION: 99 % | SYSTOLIC BLOOD PRESSURE: 118 MMHG | BODY MASS INDEX: 38.47 KG/M2 | HEART RATE: 76 BPM | WEIGHT: 190.8 LBS | HEIGHT: 59 IN | DIASTOLIC BLOOD PRESSURE: 78 MMHG

## 2025-04-03 DIAGNOSIS — E66.813 CLASS 3 SEVERE OBESITY DUE TO EXCESS CALORIES WITH SERIOUS COMORBIDITY AND BODY MASS INDEX (BMI) OF 40.0 TO 44.9 IN ADULT (HCC): Primary | ICD-10-CM

## 2025-04-03 DIAGNOSIS — E66.01 CLASS 3 SEVERE OBESITY DUE TO EXCESS CALORIES WITH SERIOUS COMORBIDITY AND BODY MASS INDEX (BMI) OF 40.0 TO 44.9 IN ADULT (HCC): Primary | ICD-10-CM

## 2025-04-03 PROCEDURE — 99215 OFFICE O/P EST HI 40 MIN: CPT | Performed by: INTERNAL MEDICINE

## 2025-04-03 RX ORDER — BUPROPION HYDROCHLORIDE 100 MG/1
TABLET, EXTENDED RELEASE ORAL
Qty: 90 TABLET | Refills: 3 | Status: SHIPPED | OUTPATIENT
Start: 2025-04-03

## 2025-04-03 RX ORDER — PHENTERMINE HYDROCHLORIDE 15 MG/1
15 CAPSULE ORAL EVERY MORNING
Qty: 30 CAPSULE | Refills: 1 | Status: SHIPPED | OUTPATIENT
Start: 2025-04-03

## 2025-04-03 RX ORDER — ETONOGESTREL AND ETHINYL ESTRADIOL .12; .015 MG/D; MG/D
RING VAGINAL
COMMUNITY
Start: 2025-03-06

## 2025-04-03 NOTE — PROGRESS NOTES
Program: [facility]    Assessment/Plan     Nery Joy  is a 29 y.o. female with  returns to follow up  for treatment of excess body weight and associated risk factors/co-morbidities.     Class 3 severe obesity due to excess calories with serious comorbidity and body mass index (BMI) of 40.0 to 44.9 in adult (HCC)  Antiobesity Medications/Medical --  Patient states that she had trouble getting Wegovy approved through [employer].  Per patient report she was told that insurance approves it but was told the opposite by [employer]  She has been taking Wellbutrin naltrexone and Topamax  Change Wellbutrin to once a day with lunch as we are introducing phentermine  Review twelve-lead EKG which was within normal limits  Will start phentermine 15 mg in the morning..  Patient reports that PCP expressed some concern as she is a   Instructed patient to trial the phentermine over the weekend and notify me if any side effects including his blood pressure heart rate etc.      Nutrition:    Provided her 1100 to 1300-calorie goal per day  Target 20-30 gm of protein with meals and 10-15 gm with snacks-list provided    Physical Activity:   20 mins of callisthenics at home 3 days a week using [employer] videos  Adding bands of free weights 2 days a week.    Sleep: -  STOP- BANG- 1/8 ; 7-8 hours of sleep per night    Food Behaviors/Stress/pyschosocial:    Less hungry on meds above       Most recent notes and records were reviewed.         Return visit:  2-3 months     Nutrition   Do not skip meals. Avoid sugary beverages. At least 64oz of water daily.    Behavioral/Stress   Food log via thalia or provided paper log (thalia options include www.myfitnesspal.com, sparkpeople.com, [employer].com, calorieking.com, [employer]). Encouraged mindful eating    Physical Activity  Increase physical activity by 10 minutes daily. Gradually increase physical activity to a goal of 5 days per week for 30 minutes of MODERATE intensity ( (  "should be able to pass the \"talk test\" but should not be able to sing.  target 150-300 minutes  PLUS 2-3 days per week of FULL BODY resistance training. Progression will be addressed at follow up visits. Encouraged planning regarding establishing physical activity routine    Sleep  Provided sleep hygiene counseling and importance of having adequate sleep duration          Nery was seen today for follow-up.    Diagnoses and all orders for this visit:    Class 3 severe obesity due to excess calories with serious comorbidity and body mass index (BMI) of 40.0 to 44.9 in adult (HCC)  -     phentermine 15 MG capsule; Take 1 capsule (15 mg total) by mouth every morning  -     buPROPion (Wellbutrin SR) 100 mg 12 hr tablet; Take 1 tablet by mouth with lunch        Phentermine Instructions:    -Take medication once daily in the morning.   -Avoid taking medication later in evening to avoid sleep disturbance  -This is a controlled substance and you will need to be monitored while on this medication which will require office visits every 6-8 weeks.  -Your blood pressure should not be 140/90 or higher and pulse should be between  bpm (beats per minute).  Notify the provider if either are consistently elevated while on this medication.  -If you are female and can get pregnant please be aware this medication can be harmful to a fetus and therefore you should have contraception methods in place.    Phentermine has as potential side effects of increased heart rate, increased blood pressure, palpitations, headache, dizziness, insomnia, altered mood, abdominal upset, and dry mouth. Notify the provider with change in mood. Please go to the closest ER if any suicidal/homicidal thoughts occur . Phentermine should be stopped 4 days prior to surgery. Notify the provider with any changes in vision. It is contraindicated in pregnancy and lactation period due to potential harm to the fetus. There is Abuse potential due to amphetamine " like effects.      Contraindications pregnancy cardiovascular disease, hyperthyroidism insomnia anxiety  MAOI therapy, pregnancy or breast-feeding     Topiramate Instructions:  -Patient was counseled that the medication is associated with cleft palate if used during the first trimester of pregnancy and therefore was instructed to use birth control during the period of her use of this medication.  -May reduce the effectiveness of hormonal birth control - backup method such as condoms recommended to avoid pregnancy.  -Upon discontinuation tapering dose over using every other day dosing is recommended.  - patient to maintain adequate fluid intake to minimize risk of kidney stones  Advise patient against sudden discontinuation, as this may cause increased seizure activity  Instruct female patient to use an additional form of contraception due to decreased effectiveness of estrogen-containing or progestin-only contraceptives  - report symptoms of acute myopia, sudden ocular pain, blurred vision, or decreased visual acuity.  -Advise patient to report new or worsening depression, suicidal thoughts or behavior, or unusual changes in mood or behavior    Reviewed the potential side effects of topiramate (Topamax) which may include - numbness or tingling, difficulty with word finding, metabolic acidosis, occurrence of gallstones and kidney stones, glaucoma, fatigue, worsening depression/anxiety, changes in taste, abdominal upset/heartburn, and trouble sleeping. Side effects may include anorexia, taste perversion, fatigue, paresthesia, psychomotor slowing, memory or concentration difficulties, cognitive dysfunction, mood problems, and flushing.     Phentermine Instructions:    -Take medication once daily in the morning.   -Avoid taking medication later in evening to avoid sleep disturbance  -This is a controlled substance and you will need to be monitored while on this medication which will require office visits every  6-8 weeks.  -Your blood pressure should not be 140/90 or higher and pulse should be between  bpm (beats per minute).  Notify the provider if either are consistently elevated while on this medication.  -If you are female and can get pregnant please be aware this medication can be harmful to a fetus and therefore you should have contraception methods in place.    Phentermine has as potential side effects of increased heart rate, increased blood pressure, palpitations, headache, dizziness, insomnia, altered mood, abdominal upset, and dry mouth. Notify the provider with change in mood. Please go to the closest ER if any suicidal/homicidal thoughts occur . Phentermine should be stopped 4 days prior to surgery. Notify the provider with any changes in vision. It is contraindicated in pregnancy and lactation period due to potential harm to the fetus. There is Abuse potential due to amphetamine like effects.      Contraindications pregnancy cardiovascular disease, hyperthyroidism insomnia anxiety  MAOI therapy, pregnancy or breast-feeding     Total time spent reviewing chart, interviewing patient, examining patient, discussing plan, answering all questions, and documentin minutes with >50% face-to-face time with the patient.            Weight trajectory     Wt Readings from Last 20 Encounters:   25 86.5 kg (190 lb 12.8 oz)   25 87.2 kg (192 lb 3.2 oz)   25 90.4 kg (199 lb 3.2 oz)   24 91.6 kg (202 lb)   24 94.9 kg (209 lb 3.2 oz)   24 91.6 kg (202 lb)             Lifestyle questionnaire       Diet recall:  B: Eggs  S:no  L: Leftovers sometimes sandwich   S:no  D: P/V/S chicken and pork Pasta potato - not eating past 8 PM  S: no  Frequency Eating out x/ week: 1x/ week    Beverages  Water--  24 oz   Caffeine/tea--  24 oz   iced tea sweetened   SSB --none    Alcohol: no drinks/ week   Smoking: no  Drug use: no    Physical Activity --running around the kids walking outside  "    Sleep -- STOP- BANG- 1/8 ; 7-8 hours of sleep per night    Occupation- 6:15 8:15 2:15-4:15     Psycho social- lives with 5,2 s.o    Gyneac (Menopausal status/periods/contraception)-IUD        Start date: 9/12/24  Intial weight : 209 lbs  Intial BMI: 42.9 kg/m2  Obesity Class: Above 40- Obesity Class III  Goal weight: 170 lbs          BMI on Weight on 1/30/2025 :90.4 kg (199 lb 3.2 oz)  lbs  BMI on 1/30/2025  :  Body mass index is 40.92 kg/m².  kg/m2 Above 40- Obesity Class III  Difference: -10 lbs    Weight on 4/3/2025 :86.5 kg (190 lb 12.8 oz)  BMI on  4/3/2025 : Body mass index is 39.2 kg/m². 35.0-39.9- Obesity Class II  Difference: -19 lbs      Anti obesity Medications/ Medical---    Weight loss medication and dose: Topomax  Date initiated: Jan 2025             Anti obesity Medications/ Medical---    Weight loss medication and dose: Wellbutrin naltrexone  Date initiated: September 2024    Waist circumference (inches): 34.5 Inches          Objective         The following portions of the patient's history were reviewed and updated as appropriate: allergies, current medications, past family history, past medical history, past social history, past surgical history, and problem list.      /78   Pulse 76   Ht 4' 10.5\" (1.486 m)   Wt 86.5 kg (190 lb 12.8 oz)   LMP  (LMP Unknown)   SpO2 99%   BMI 39.20 kg/m²             Review of Systems   Constitutional:  Negative for fatigue.   HENT:  Negative for sore throat.    Respiratory:  Negative for cough and shortness of breath.    Cardiovascular:  Negative for chest pain, palpitations and leg swelling.   Gastrointestinal:  Negative for abdominal pain, constipation, diarrhea and nausea.   Genitourinary:  Negative for dysuria.   Musculoskeletal:  Negative for arthralgias and back pain.   Skin:  Negative for rash.   Neurological:  Negative for headaches.   Psychiatric/Behavioral:  Negative for dysphoric mood. The patient is not nervous/anxious.  "         Physical Exam  Vitals and nursing note reviewed.   Constitutional:       Appearance: Normal appearance.   HENT:      Head: Normocephalic.   Pulmonary:      Effort: Pulmonary effort is normal.   Neurological:      General: No focal deficit present.      Mental Status: She is alert and oriented to person, place, and time.   Psychiatric:         Mood and Affect: Mood normal.         Behavior: Behavior normal.         Thought Content: Thought content normal.         Judgment: Judgment normal.          Current medications       Current Outpatient Medications:     buPROPion (Wellbutrin SR) 100 mg 12 hr tablet, Take 1 tablet by mouth with lunch, Disp: 90 tablet, Rfl: 3    EluRyng 0.12-0.015 MG/24HR vaginal ring, INSERT 1 RING VAGINALLY AS DIRECTED. REMOVE AFTER 3 WEEKS & WAIT 7 DAYS BEFORE INSERTING A NEW RING, Disp: , Rfl:     naltrexone (REVIA) 50 mg tablet, Take 1/2 tablet twice a day, Disp: 30 tablet, Rfl: 2    phentermine 15 MG capsule, Take 1 capsule (15 mg total) by mouth every morning, Disp: 30 capsule, Rfl: 1    topiramate (Topamax) 25 mg tablet, Take 1 tablet 20 minutes before evening meal and after 1 week increase to 1 tablet twice a day before meals, Disp: 60 tablet, Rfl: 3    Unithroid 137 MCG tablet, , Disp: , Rfl:     levonorgestrel (DELFINA) 13.5 MG intrauterine device, , Disp: , Rfl:          Medication considerations/contraindications     -Patient denies personal history of pancreatitis. Patient also denies personal and family history of medullary thyroid cancer, and multiple endocrine neoplasia type 2 (MEN 2 tumor). -Patient denies any history of kidney stones, seizures, or glaucoma, diabetic retinopathy, gall bladder disease, gastroparesis, hyperthyroidism.  -Denies Hx of CAD, PAD, palpitations, arrhythmia, uncontrolled hypertension  -Denies uncontrolled anxiety or depression, suicidal behavior or thinking , insomnia or sleep disturbance.         Labs     Most recent labs reviewed   Lab Results  "  Component Value Date    SODIUM 141 02/19/2025    K 4.0 02/19/2025     02/19/2025    CO2 21 02/19/2025    BUN 15 02/19/2025    CREATININE 0.94 02/19/2025    GLUC 70 02/19/2025    AST 19 02/19/2025    ALT 18 02/19/2025    TP 6.8 02/19/2025    TBILI 0.6 02/19/2025    EGFR 84 02/19/2025     Lab Results   Component Value Date    HGBA1C 5.5 02/19/2025     Lab Results   Component Value Date    TSH 6.140 (H) 02/19/2025     Lab Results   Component Value Date    CHOLESTEROL 130 02/19/2025     Lab Results   Component Value Date    HDL 57 02/19/2025     Lab Results   Component Value Date    TRIG 155 (H) 02/19/2025     Lab Results   Component Value Date    LDLCALC 47 02/19/2025     No results found for: \"[id number]\", \"LABINSU\"      "

## 2025-04-03 NOTE — ASSESSMENT & PLAN NOTE
Antiobesity Medications/Medical --  Patient states that she had trouble getting Wegovy approved through [employer].  Per patient report she was told that insurance approves it but was told the opposite by Pager  She has been taking Wellbutrin naltrexone and Topamax  Change Wellbutrin to once a day with lunch as we are introducing phentermine  Review twelve-lead EKG which was within normal limits  Will start phentermine 15 mg in the morning..  Patient reports that PCP expressed some concern as she is a   Instructed patient to trial the phentermine over the weekend and notify me if any side effects including his blood pressure heart rate etc.      Nutrition:    Provided her 1100 to 1300-calorie goal per day  Target 20-30 gm of protein with meals and 10-15 gm with snacks-list provided    Physical Activity:   20 mins of callisthenics at home 3 days a week using [employer] videos  Adding bands of free weights 2 days a week.    Sleep: -  STOP- BANG- 1/8 ; 7-8 hours of sleep per night    Food Behaviors/Stress/pyschosocial:    Less hungry on meds above

## 2025-05-29 DIAGNOSIS — E66.813 CLASS 3 SEVERE OBESITY DUE TO EXCESS CALORIES WITH SERIOUS COMORBIDITY AND BODY MASS INDEX (BMI) OF 40.0 TO 44.9 IN ADULT: ICD-10-CM

## 2025-05-30 RX ORDER — TOPIRAMATE 25 MG/1
TABLET, FILM COATED ORAL
Qty: 60 TABLET | Refills: 1 | Status: SHIPPED | OUTPATIENT
Start: 2025-05-30

## 2025-06-04 DIAGNOSIS — E66.813 CLASS 3 SEVERE OBESITY DUE TO EXCESS CALORIES WITH SERIOUS COMORBIDITY AND BODY MASS INDEX (BMI) OF 40.0 TO 44.9 IN ADULT: ICD-10-CM

## 2025-06-04 RX ORDER — NALTREXONE HYDROCHLORIDE 50 MG/1
TABLET, FILM COATED ORAL
Qty: 30 TABLET | Refills: 2 | Status: SHIPPED | OUTPATIENT
Start: 2025-06-04 | End: 2025-06-09 | Stop reason: SDUPTHER

## 2025-06-09 ENCOUNTER — OFFICE VISIT (OUTPATIENT)
Dept: BARIATRICS | Facility: CLINIC | Age: 30
End: 2025-06-09
Payer: COMMERCIAL

## 2025-06-09 VITALS
HEART RATE: 81 BPM | WEIGHT: 184.6 LBS | HEIGHT: 59 IN | OXYGEN SATURATION: 99 % | BODY MASS INDEX: 37.21 KG/M2 | DIASTOLIC BLOOD PRESSURE: 68 MMHG | SYSTOLIC BLOOD PRESSURE: 100 MMHG

## 2025-06-09 DIAGNOSIS — E66.813 CLASS 3 SEVERE OBESITY DUE TO EXCESS CALORIES WITH SERIOUS COMORBIDITY AND BODY MASS INDEX (BMI) OF 40.0 TO 44.9 IN ADULT: Primary | ICD-10-CM

## 2025-06-09 PROCEDURE — 99215 OFFICE O/P EST HI 40 MIN: CPT | Performed by: INTERNAL MEDICINE

## 2025-06-09 RX ORDER — PHENTERMINE HYDROCHLORIDE 15 MG/1
15 CAPSULE ORAL EVERY MORNING
Qty: 30 CAPSULE | Refills: 1 | Status: SHIPPED | OUTPATIENT
Start: 2025-06-09

## 2025-06-09 RX ORDER — TOPIRAMATE 25 MG/1
TABLET, FILM COATED ORAL
Qty: 60 TABLET | Refills: 3 | Status: SHIPPED | OUTPATIENT
Start: 2025-06-09

## 2025-06-09 RX ORDER — BUPROPION HYDROCHLORIDE 100 MG/1
TABLET, EXTENDED RELEASE ORAL
Qty: 90 TABLET | Refills: 3 | Status: SHIPPED | OUTPATIENT
Start: 2025-06-09

## 2025-06-09 RX ORDER — NALTREXONE HYDROCHLORIDE 50 MG/1
TABLET, FILM COATED ORAL
Qty: 30 TABLET | Refills: 2 | Status: SHIPPED | OUTPATIENT
Start: 2025-06-09

## 2025-06-09 NOTE — PROGRESS NOTES
"  Program: [facility]    Assessment/Plan     Nery Joy  is a 29 y.o. female with  returns to follow up  for treatment of excess body weight and associated risk factors/co-morbidities.     Class 3 severe obesity due to excess calories with serious comorbidity and body mass index (BMI) of 40.0 to 44.9 in adult (HCC)  Antiobesity Medications/Medical --  On phentermine 15 increasing the dose of Topamax to 50 mg in the morning and topomax P/T AM  Continue naltrexone and bupropion in the evening at 4 PM  Next office visit will consider increasing either Wellbutrin or phentermine  We will recheck CMP to evaluate for metabolic acidosis after we increase the dose of Topamax to 100 mg  On max dose of naltrexone  She is on 4 of the 5 oral agents.  No contraindication to metformin  Insurance does not cover GLP-1 medications      Nutrition:    Provided her 1100 to 1300-calorie goal per day  Target 20-30 gm of protein with meals and 10-15 gm with snacks-list provided    Physical Activity:   20 mins of cardio at home 3 days a week using [employer] videos  Adding bands of free weights 2 days a week.       Sleep: -  STOP- BANG- 1/8 ; 7-8 hours of sleep per night     Food Behaviors/Stress/pyschosocial:    Less hungry on meds above         Most recent notes and records were reviewed.         Return visit:  2-3 months     Nutrition   Do not skip meals. Avoid sugary beverages. At least 64oz of water daily.    Behavioral/Stress   Food log via thalia or provided paper log (thalia options include www.Hachimenroppi.com, sparkpeople.com, loseit.com, calorieking.com, [employer]). Encouraged mindful eating    Physical Activity  Increase physical activity by 10 minutes daily. Gradually increase physical activity to a goal of 5 days per week for 30 minutes of MODERATE intensity ( ( should be able to pass the \"talk test\" but should not be able to sing.  target 150-300 minutes  PLUS 2-3 days per week of FULL BODY resistance training. " Progression will be addressed at follow up visits. Encouraged planning regarding establishing physical activity routine    Sleep  Provided sleep hygiene counseling and importance of having adequate sleep duration          Nery was seen today for follow-up.    Diagnoses and all orders for this visit:    Class 3 severe obesity due to excess calories with serious comorbidity and body mass index (BMI) of 40.0 to 44.9 in adult  -     topiramate (Topamax) 25 mg tablet; Take 2 tablets in AM  -     phentermine 15 MG capsule; Take 1 capsule (15 mg total) by mouth every morning  -     naltrexone (REVIA) 50 mg tablet; Take 1/2 tablet twice a day  -     buPROPion (Wellbutrin SR) 100 mg 12 hr tablet; Take 1 tablet by mouth with lunch          Phentermine Instructions:    -Take medication once daily in the morning.   -Avoid taking medication later in evening to avoid sleep disturbance  -This is a controlled substance and you will need to be monitored while on this medication which will require office visits every 6-8 weeks.  -Your blood pressure should not be 140/90 or higher and pulse should be between  bpm (beats per minute).  Notify the provider if either are consistently elevated while on this medication.  -If you are female and can get pregnant please be aware this medication can be harmful to a fetus and therefore you should have contraception methods in place.    Phentermine has as potential side effects of increased heart rate, increased blood pressure, palpitations, headache, dizziness, insomnia, altered mood, abdominal upset, and dry mouth. Notify the provider with change in mood. Please go to the closest ER if any suicidal/homicidal thoughts occur . Phentermine should be stopped 4 days prior to surgery. Notify the provider with any changes in vision. It is contraindicated in pregnancy and lactation period due to potential harm to the fetus. There is Abuse potential due to amphetamine like effects.       Contraindications pregnancy cardiovascular disease, hyperthyroidism insomnia anxiety  MAOI therapy, pregnancy or breast-feeding     Topiramate Instructions:  -Patient was counseled that the medication is associated with cleft palate if used during the first trimester of pregnancy and therefore was instructed to use birth control during the period of her use of this medication.  -May reduce the effectiveness of hormonal birth control - backup method such as condoms recommended to avoid pregnancy.  -Upon discontinuation tapering dose over using every other day dosing is recommended.  - patient to maintain adequate fluid intake to minimize risk of kidney stones  Advise patient against sudden discontinuation, as this may cause increased seizure activity  Instruct female patient to use an additional form of contraception due to decreased effectiveness of estrogen-containing or progestin-only contraceptives  - report symptoms of acute myopia, sudden ocular pain, blurred vision, or decreased visual acuity.  -Advise patient to report new or worsening depression, suicidal thoughts or behavior, or unusual changes in mood or behavior    Reviewed the potential side effects of topiramate (Topamax) which may include - numbness or tingling, difficulty with word finding, metabolic acidosis, occurrence of gallstones and kidney stones, glaucoma, fatigue, worsening depression/anxiety, changes in taste, abdominal upset/heartburn, and trouble sleeping. Side effects may include anorexia, taste perversion, fatigue, paresthesia, psychomotor slowing, memory or concentration difficulties, cognitive dysfunction, mood problems, and flushing.     Phentermine Instructions:    -Take medication once daily in the morning.   -Avoid taking medication later in evening to avoid sleep disturbance  -This is a controlled substance and you will need to be monitored while on this medication which will require office visits every 6-8 weeks.  -Your  blood pressure should not be 140/90 or higher and pulse should be between  bpm (beats per minute).  Notify the provider if either are consistently elevated while on this medication.  -If you are female and can get pregnant please be aware this medication can be harmful to a fetus and therefore you should have contraception methods in place.    Phentermine has as potential side effects of increased heart rate, increased blood pressure, palpitations, headache, dizziness, insomnia, altered mood, abdominal upset, and dry mouth. Notify the provider with change in mood. Please go to the closest ER if any suicidal/homicidal thoughts occur . Phentermine should be stopped 4 days prior to surgery. Notify the provider with any changes in vision. It is contraindicated in pregnancy and lactation period due to potential harm to the fetus. There is Abuse potential due to amphetamine like effects.      Contraindications pregnancy cardiovascular disease, hyperthyroidism insomnia anxiety  MAOI therapy, pregnancy or breast-feeding     Total time spent reviewing chart, interviewing patient, examining patient, discussing plan, answering all questions, and documentin minutes with >50% face-to-face time with the patient.            Weight trajectory     Wt Readings from Last 20 Encounters:   25 83.7 kg (184 lb 9.6 oz)   25 86.5 kg (190 lb 12.8 oz)   25 87.2 kg (192 lb 3.2 oz)   25 90.4 kg (199 lb 3.2 oz)   24 91.6 kg (202 lb)   24 94.9 kg (209 lb 3.2 oz)   24 91.6 kg (202 lb)             Lifestyle questionnaire       Diet recall:  B: Eggs  S:no  L: Leftovers sometimes sandwich   S:no  D: P/V/S chicken and pork Pasta potato 6 30-7 not eating past 8 PM  S: no  Frequency Eating out x/ week: 1x/ week    Beverages  Water--  24 oz   Caffeine/tea--  24 oz   iced tea sweetened   SSB --none    Alcohol: no drinks/ week   Smoking: no  Drug use: no    Physical Activity --running around the kids  "walking outside     Sleep -- STOP- BANG- 1/8 ; 7-8 hours of sleep per night    Occupation- 6:15 8:15 2:15-4:15     Psycho social- lives with 5,2 s.o    Gyneac (Menopausal status/periods/contraception)-IUD        Start date: 9/12/24  Intial weight : 209 lbs  Intial BMI: 42.9 kg/m2  Obesity Class: Above 40- Obesity Class III  Goal weight: 170 lbs          BMI on Weight on 1/30/2025 :90.4 kg (199 lb 3.2 oz)  lbs  BMI on 1/30/2025  :  Body mass index is 40.92 kg/m².  kg/m2 Above 40- Obesity Class III  Difference: -10 lbs    Weight on 4/3/2025 :86.5 kg (190 lb 12.8 oz)  BMI on  4/3/2025 : Body mass index is 39.2 kg/m². 35.0-39.9- Obesity Class II  Difference: -19 lbs    Weight on 6/9/2025 :83.7 kg (184 lb 9.6 oz)(-6)  BMI on  6/9/2025 : Body mass index is 37.92 kg/m². 35.0-39.9- Obesity Class II  Difference: -25 lbs      Anti obesity Medications/ Medical---    Weight loss medication and dose: Phentermine  Date initiated: April 2025         Anti obesity Medications/ Medical---    Weight loss medication and dose: Topomax  Date initiated: Jan 2025             Anti obesity Medications/ Medical---    Weight loss medication and dose: Wellbutrin naltrexone  Date initiated: September 2024    Waist circumference (inches): 33.5 Inches          Objective         The following portions of the patient's history were reviewed and updated as appropriate: allergies, current medications, past family history, past medical history, past social history, past surgical history, and problem list.      /68   Pulse 81   Ht 4' 10.5\" (1.486 m)   Wt 83.7 kg (184 lb 9.6 oz)   LMP 05/30/2025   SpO2 99%   BMI 37.92 kg/m²             Review of Systems   Constitutional:  Negative for fatigue.   HENT:  Negative for sore throat.    Respiratory:  Negative for cough and shortness of breath.    Cardiovascular:  Negative for chest pain, palpitations and leg swelling.   Gastrointestinal:  Negative for abdominal pain, constipation, " diarrhea and nausea.   Genitourinary:  Negative for dysuria.   Musculoskeletal:  Negative for arthralgias and back pain.   Skin:  Negative for rash.   Neurological:  Negative for headaches.   Psychiatric/Behavioral:  Negative for dysphoric mood. The patient is not nervous/anxious.          Physical Exam  Vitals and nursing note reviewed.   Constitutional:       Appearance: Normal appearance.   HENT:      Head: Normocephalic.   Pulmonary:      Effort: Pulmonary effort is normal.   Neurological:      General: No focal deficit present.      Mental Status: She is alert and oriented to person, place, and time.   Psychiatric:         Mood and Affect: Mood normal.         Behavior: Behavior normal.         Thought Content: Thought content normal.         Judgment: Judgment normal.          Current medications       Current Outpatient Medications:     buPROPion (Wellbutrin SR) 100 mg 12 hr tablet, Take 1 tablet by mouth with lunch, Disp: 90 tablet, Rfl: 3    EluRyng 0.12-0.015 MG/24HR vaginal ring, , Disp: , Rfl:     naltrexone (REVIA) 50 mg tablet, Take 1/2 tablet twice a day, Disp: 30 tablet, Rfl: 2    phentermine 15 MG capsule, Take 1 capsule (15 mg total) by mouth every morning, Disp: 30 capsule, Rfl: 1    topiramate (Topamax) 25 mg tablet, Take 2 tablets in AM, Disp: 60 tablet, Rfl: 3    Unithroid 137 MCG tablet, , Disp: , Rfl:     levonorgestrel (DELFINA) 13.5 MG intrauterine device, , Disp: , Rfl:          Medication considerations/contraindications     -Patient denies personal history of pancreatitis. Patient also denies personal and family history of medullary thyroid cancer, and multiple endocrine neoplasia type 2 (MEN 2 tumor). -Patient denies any history of kidney stones, seizures, or glaucoma, diabetic retinopathy, gall bladder disease, gastroparesis, hyperthyroidism.  -Denies Hx of CAD, PAD, palpitations, arrhythmia, uncontrolled hypertension  -Denies uncontrolled anxiety or depression, suicidal behavior or  "thinking , insomnia or sleep disturbance.         Labs     Most recent labs reviewed   Lab Results   Component Value Date    SODIUM 141 02/19/2025    K 4.0 02/19/2025     02/19/2025    CO2 21 02/19/2025    BUN 15 02/19/2025    CREATININE 0.94 02/19/2025    GLUC 70 02/19/2025    AST 19 02/19/2025    ALT 18 02/19/2025    TP 6.8 02/19/2025    TBILI 0.6 02/19/2025    EGFR 84 02/19/2025     Lab Results   Component Value Date    HGBA1C 5.5 02/19/2025     Lab Results   Component Value Date    TSH 6.140 (H) 02/19/2025     Lab Results   Component Value Date    CHOLESTEROL 130 02/19/2025     Lab Results   Component Value Date    HDL 57 02/19/2025     Lab Results   Component Value Date    TRIG 155 (H) 02/19/2025     Lab Results   Component Value Date    LDLCALC 47 02/19/2025     No results found for: \"[id number]\", \"LABINSU\"      "

## 2025-06-09 NOTE — ASSESSMENT & PLAN NOTE
Antiobesity Medications/Medical --  On phentermine 15 increasing the dose of Topamax to 50 mg in the morning and topomax P/T AM  Continue naltrexone and bupropion in the evening at 4 PM  Next office visit will consider increasing either Wellbutrin or phentermine  We will recheck CMP to evaluate for metabolic acidosis after we increase the dose of Topamax to 100 mg  On max dose of naltrexone  She is on 4 of the 5 oral agents.  No contraindication to metformin  Insurance does not cover GLP-1 medications      Nutrition:    Provided her 1100 to 1300-calorie goal per day  Target 20-30 gm of protein with meals and 10-15 gm with snacks-list provided    Physical Activity:   20 mins of cardio at home 3 days a week using YouTube videos  Adding bands of free weights 2 days a week.       Sleep: -  STOP- BANG- 1/8 ; 7-8 hours of sleep per night     Food Behaviors/Stress/pyschosocial:    Less hungry on meds above

## 2025-06-30 ENCOUNTER — TELEPHONE (OUTPATIENT)
Age: 30
End: 2025-06-30

## 2025-06-30 NOTE — TELEPHONE ENCOUNTER
Incoming call from patient - calling to schedule new patient appointment OB. Unknown LMP - believes possibly 5/25 or before. Scheduled for first available appointment 7/17. Patient had +UPT 6/29. Took UPT because she realized she was likely late for menses.     Advised patient to take prenatal vitamin and contact office with vaginal bleeding, abdominal pain.

## 2025-07-09 ENCOUNTER — TELEPHONE (OUTPATIENT)
Dept: BARIATRICS | Facility: CLINIC | Age: 30
End: 2025-07-09

## 2025-07-10 ENCOUNTER — TELEPHONE (OUTPATIENT)
Age: 30
End: 2025-07-10

## 2025-07-10 NOTE — TELEPHONE ENCOUNTER
LMP 5/25, 6w4d, D&V scheduled for 7/17    New patient, reports she stopped these meds for weight loss assistance when she found out she was pregnant, ~4weeks.      Topamax  Bupropion  Phentermine  Naltrexone    She is worried that the fetus may have been exposed.  She is not concerned with restarting these meds, as she was only on them for weight loss and doesn't plan to restart them. Advised continue off the meds, and she should discuss with provider at her upcoming visit.

## 2025-07-12 NOTE — TELEPHONE ENCOUNTER
Was informed by staff that patient is pregnant (5 to 6 weeks).  Called patient to discuss  care. She verbalized awareness of pregnancy contraindication of topiramate and phentermine which was discussed with her at prior appointments.  She reports unplanned pregnancy on NuvaRing.  She reports discontinuing the Topamax 4 weeks ago.  Confirmed cessation of these medications throughout the pregnancy and lactation timeframe.  Patient has upcoming OB/GYN appointment for further managment

## 2025-07-14 NOTE — PROGRESS NOTES
"S: 29 y.o.  who presents for viability scan with LMP of 25. She is 6 weeks and 6 days by her LMP. Her menses are reg. Her previous pregnancies are complicated by LTCS, PTD at 34 weeks, and oligohydramnios.     Past Medical History[1]    OB History    Para Term  AB Living   5 2 1 1 2 1   SAB IAB Ectopic Multiple Live Births   1 1   1      # Outcome Date GA Lbr Vaibhav/2nd Weight Sex Type Anes PTL Lv   5 Current            4  22 34w3d    CS-Unspec         Birth Comments: 30 weeks intiated weekly US due to history of IUGR. was told on US that she had \"placenta issues\".       Complications: History of prior pregnancy with intrauterine growth restricted    3 SAB 2021 12w0d             Birth Comments: on US no FHR noted.    2 Term 19 39w2d  2353 g (5 lb 3 oz) F CS-Unspec   LES      Birth Comments: induced due to IUGR? fetal distress emergency .    1 IAB 11 12w0d       FD        O:  Vitals:    25 1452   BP: 110/64   BP Location: Left arm   Patient Position: Sitting   Cuff Size: Standard   Weight: 84.8 kg (187 lb)   Height: 4' 10.5\" (1.486 m)       TVUS: viable, kruger  IUP, measuring 0.76 cm, correlating with 6w5d. TEGAN of 26, based off LMP. .                  A/P:  1. Viable pregnancy on TVUS  2. MFM referral placed.  3. RTC in 2 week for nurse intake visit    Problem List Items Addressed This Visit    None  Visit Diagnoses         Positive pregnancy test    -  Primary    Relevant Orders    AMB US OB < 14 weeks single or first gestation level 1 (Completed)      6 weeks gestation of pregnancy        Relevant Orders    Ambulatory Referral to Maternal Fetal Medicine                 [1]   Past Medical History:  Diagnosis Date    Disease of thyroid gland     Hypothyroid      "

## 2025-07-17 ENCOUNTER — ULTRASOUND (OUTPATIENT)
Dept: OBGYN CLINIC | Facility: CLINIC | Age: 30
End: 2025-07-17
Payer: COMMERCIAL

## 2025-07-17 VITALS
WEIGHT: 187 LBS | HEIGHT: 59 IN | BODY MASS INDEX: 37.7 KG/M2 | SYSTOLIC BLOOD PRESSURE: 110 MMHG | DIASTOLIC BLOOD PRESSURE: 64 MMHG

## 2025-07-17 DIAGNOSIS — Z3A.01 6 WEEKS GESTATION OF PREGNANCY: ICD-10-CM

## 2025-07-17 DIAGNOSIS — Z32.01 POSITIVE PREGNANCY TEST: Primary | ICD-10-CM

## 2025-07-17 PROCEDURE — 76817 TRANSVAGINAL US OBSTETRIC: CPT

## 2025-07-17 PROCEDURE — 99203 OFFICE O/P NEW LOW 30 MIN: CPT

## 2025-08-06 ENCOUNTER — OFFICE VISIT (OUTPATIENT)
Dept: FAMILY MEDICINE CLINIC | Facility: CLINIC | Age: 30
End: 2025-08-06
Payer: COMMERCIAL

## 2025-08-06 VITALS
RESPIRATION RATE: 18 BRPM | DIASTOLIC BLOOD PRESSURE: 56 MMHG | OXYGEN SATURATION: 100 % | WEIGHT: 188 LBS | TEMPERATURE: 98.1 F | BODY MASS INDEX: 37.9 KG/M2 | HEART RATE: 72 BPM | HEIGHT: 59 IN | SYSTOLIC BLOOD PRESSURE: 106 MMHG

## 2025-08-06 DIAGNOSIS — H92.01 RIGHT EAR PAIN: Primary | ICD-10-CM

## 2025-08-06 PROCEDURE — 99213 OFFICE O/P EST LOW 20 MIN: CPT

## 2025-08-08 ENCOUNTER — ULTRASOUND (OUTPATIENT)
Dept: OBGYN CLINIC | Facility: CLINIC | Age: 30
End: 2025-08-08
Payer: COMMERCIAL

## 2025-08-08 VITALS
WEIGHT: 187 LBS | SYSTOLIC BLOOD PRESSURE: 112 MMHG | DIASTOLIC BLOOD PRESSURE: 64 MMHG | BODY MASS INDEX: 37.7 KG/M2 | HEIGHT: 59 IN

## 2025-08-08 DIAGNOSIS — Z32.01 POSITIVE PREGNANCY TEST: Primary | ICD-10-CM

## 2025-08-08 DIAGNOSIS — Z3A.10 10 WEEKS GESTATION OF PREGNANCY: ICD-10-CM

## 2025-08-08 PROCEDURE — 76817 TRANSVAGINAL US OBSTETRIC: CPT

## 2025-08-08 PROCEDURE — 99213 OFFICE O/P EST LOW 20 MIN: CPT

## 2025-08-15 ENCOUNTER — INITIAL PRENATAL (OUTPATIENT)
Dept: OBGYN CLINIC | Facility: CLINIC | Age: 30
End: 2025-08-15

## 2025-08-20 ENCOUNTER — NURSE TRIAGE (OUTPATIENT)
Age: 30
End: 2025-08-20

## 2025-08-25 PROBLEM — O09.899 HISTORY OF PRETERM DELIVERY, CURRENTLY PREGNANT: Status: ACTIVE | Noted: 2025-08-25

## 2025-08-25 PROBLEM — O09.299 IUGR (INTRAUTERINE GROWTH RESTRICTION) IN PRIOR PREGNANCY, PREGNANT: Status: ACTIVE | Noted: 2025-08-25
